# Patient Record
Sex: FEMALE | Race: WHITE | Employment: FULL TIME | URBAN - NONMETROPOLITAN AREA
[De-identification: names, ages, dates, MRNs, and addresses within clinical notes are randomized per-mention and may not be internally consistent; named-entity substitution may affect disease eponyms.]

---

## 2021-09-02 ENCOUNTER — HOSPITAL ENCOUNTER (EMERGENCY)
Age: 56
Discharge: HOME | End: 2021-09-02
Payer: COMMERCIAL

## 2021-09-02 ENCOUNTER — APPOINTMENT (OUTPATIENT)
Dept: GENERAL RADIOLOGY | Age: 56
End: 2021-09-02
Payer: COMMERCIAL

## 2021-09-02 VITALS
RESPIRATION RATE: 14 BRPM | DIASTOLIC BLOOD PRESSURE: 95 MMHG | SYSTOLIC BLOOD PRESSURE: 152 MMHG | OXYGEN SATURATION: 99 % | TEMPERATURE: 98.9 F | HEART RATE: 68 BPM

## 2021-09-02 DIAGNOSIS — S42.401A CLOSED FRACTURE OF RIGHT ELBOW, INITIAL ENCOUNTER: ICD-10-CM

## 2021-09-02 DIAGNOSIS — S00.83XA CONTUSION OF FACE, INITIAL ENCOUNTER: ICD-10-CM
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
## 2021-09-02 PROCEDURE — 90715 TDAP VACCINE 7 YRS/> IM: CPT | Performed by: PHYSICIAN ASSISTANT

## 2021-09-02 PROCEDURE — 6360000002 HC RX W HCPCS: Performed by: PHYSICIAN ASSISTANT

## 2021-09-02 PROCEDURE — 6370000000 HC RX 637 (ALT 250 FOR IP): Performed by: PHYSICIAN ASSISTANT

## 2021-09-02 PROCEDURE — 90471 IMMUNIZATION ADMIN: CPT | Performed by: PHYSICIAN ASSISTANT

## 2021-09-02 PROCEDURE — 73090 X-RAY EXAM OF FOREARM: CPT

## 2021-09-02 PROCEDURE — 29105 APPLICATION LONG ARM SPLINT: CPT

## 2021-09-02 PROCEDURE — 99284 EMERGENCY DEPT VISIT MOD MDM: CPT

## 2021-09-02 RX ORDER — IBUPROFEN 600 MG/1
600 TABLET ORAL EVERY 6 HOURS PRN
Qty: 120 TABLET | Refills: 0 | Status: SHIPPED | OUTPATIENT
Start: 2021-09-02

## 2021-09-02 RX ORDER — HYDROCODONE BITARTRATE AND ACETAMINOPHEN 5; 325 MG/1; MG/1
1 TABLET ORAL ONCE
Status: COMPLETED | OUTPATIENT
Start: 2021-09-02 | End: 2021-09-02

## 2021-09-02 RX ORDER — IBUPROFEN 400 MG/1
400 TABLET ORAL ONCE
Status: COMPLETED | OUTPATIENT
Start: 2021-09-02 | End: 2021-09-02

## 2021-09-02 RX ORDER — TRAMADOL HYDROCHLORIDE 50 MG/1
50 TABLET ORAL EVERY 6 HOURS PRN
Qty: 30 TABLET | Refills: 0 | Status: SHIPPED | OUTPATIENT
Start: 2021-09-02 | End: 2021-09-05

## 2021-09-02 RX ADMIN — IBUPROFEN 400 MG: 400 TABLET, FILM COATED ORAL at 17:52

## 2021-09-02 RX ADMIN — HYDROCODONE BITARTRATE AND ACETAMINOPHEN 1 TABLET: 5; 325 TABLET ORAL at 17:52

## 2021-09-02 RX ADMIN — TETANUS TOXOID, REDUCED DIPHTHERIA TOXOID AND ACELLULAR PERTUSSIS VACCINE, ADSORBED 0.5 ML: 5; 2.5; 8; 8; 2.5 SUSPENSION INTRAMUSCULAR at 17:53

## 2021-09-02 ASSESSMENT — PAIN DESCRIPTION - FREQUENCY: FREQUENCY: CONTINUOUS

## 2021-09-02 ASSESSMENT — PAIN DESCRIPTION - ORIENTATION: ORIENTATION: LEFT;RIGHT

## 2021-09-02 ASSESSMENT — PAIN DESCRIPTION - DESCRIPTORS: DESCRIPTORS: SORE

## 2021-09-02 ASSESSMENT — PAIN SCALES - GENERAL
PAINLEVEL_OUTOF10: 10
PAINLEVEL_OUTOF10: 10

## 2021-09-02 ASSESSMENT — PAIN DESCRIPTION - PAIN TYPE: TYPE: ACUTE PAIN

## 2021-09-02 ASSESSMENT — PAIN DESCRIPTION - LOCATION: LOCATION: ARM

## 2021-09-02 NOTE — ED PROVIDER NOTES
[facility] ED  eMERGENCY dEPARTMENT eNCOUnter      Pt Name: Elenita Crum  MRN: [id number]  [place] 1965  Date of evaluation: 9/2/21      CHIEF COMPLAINT       Chief Complaint   Patient presents with    Fall     pt states she trippe don her concrete steps at home just PTA. Pt c/o bilateral arm pain, facial pain         HISTORY OF PRESENT ILLNESS    Elenita Crum is a 48 y.o. female who presents complaining of tripped fell on outstretched arms  The history is provided by the patient. Arm Injury  Location:  Arm  Arm location:  R forearm and L forearm  Injury: yes    Time since incident:  1 hour  Mechanism of injury: fall    Fall:     Fall occurred: Tripped on the steps fell on outstretched hands causing pain to the right arm also suffered abrasions to the face and nose no loss of consciousness scraped her knees as well. She has having pain in the right forearm and as well as the left forearm. Worst.    Impact surface:  [place]    Point of impact:  Outstretched arms    Entrapped after fall: no    Pain details:     Quality:  Aching    Radiates to:  Does not radiate    Severity:  Moderate    Onset quality:  Sudden    Duration:  1 hour    Timing:  Intermittent    Progression:  Waxing and waning  Handedness:  Ambidextrous  Dislocation: no    Foreign body present:  No foreign bodies  Tetanus status:  Out of date  Prior injury to area:  Unable to specify  Relieved by:  Nothing  Worsened by: Movement  Ineffective treatments:  None tried  Associated symptoms: decreased range of motion and swelling    Associated symptoms: no tingling        REVIEW OF SYSTEMS       Review of Systems   Musculoskeletal: Positive for arthralgias. Bilateral forearm pain right worse than left. All other systems reviewed and are negative.       PAST MEDICAL HISTORY     Past Medical History:   Diagnosis Date    Cancer [facility])     rt breast lumpectomy    Lymph edema        SURGICAL HISTORY       Past Surgical History:   Procedure Laterality Date    BREAST SURGERY Right     CARPAL TUNNEL RELEASE Bilateral      SECTION      FOOT SURGERY Left     neuroma    FOOT SURGERY Right 2013    excision soft tissue right foot    COOPER AND BSO      13    TUBAL LIGATION      TUNNELED VENOUS PORT PLACEMENT      in and out       CURRENT MEDICATIONS       Previous Medications    ALPRAZOLAM (XANAX) 1 MG TABLET    Take 1 mg by mouth daily. ASPIRIN 81 MG TABLET    Take 81 mg by mouth daily. ORPHENADRINE (NORFLEX) 100 MG TABLET    Take 100 mg by mouth 2 times daily as needed. OXYCODONE-ACETAMINOPHEN (PERCOCET) 5-325 MG PER TABLET    Take 1 tablet by mouth every 6 hours as needed for Pain for 60 doses. TAMOXIFEN (NOLVADEX) 10 MG TABLET    Take 10 mg by mouth daily. ALLERGIES     is allergic to penicillins. FAMILY HISTORY     She indicated that her mother is alive. She indicated that her father is . She indicated that her sister is alive. SOCIAL HISTORY      reports that she has been smoking cigarettes. She has a 5.00 pack-year smoking history. She does not have any smokeless tobacco history on file. She reports current alcohol use. She reports that she does not use drugs. PHYSICAL EXAM     INITIAL VITALS: BP (!) 152/95   Pulse 68   Temp 98.9 °F (37.2 °C) (Tympanic)   Resp 14   SpO2 99%      Physical Exam  Vitals and nursing note reviewed. Constitutional:       Appearance: She is well-developed. HENT:      Head: Normocephalic. Abrasion present. No raccoon eyes, Mcgee's sign or laceration. Comments: Shows a few scattered superficial abrasions to the forehead nose and chin     Right Ear: Tympanic membrane normal.      Left Ear: Tympanic membrane normal.      Mouth/Throat:      Mouth: Mucous membranes are moist.   Eyes:      Extraocular Movements: Extraocular movements intact. Pupils: Pupils are equal, round, and reactive to light.    Cardiovascular:      Rate and Rhythm: Normal rate and regular rhythm. Heart sounds: Normal heart sounds. No murmur heard. Pulmonary:      Effort: Pulmonary effort is normal.      Breath sounds: Normal breath sounds. Abdominal:      General: Bowel sounds are normal.      Palpations: Abdomen is soft. Tenderness: There is no abdominal tenderness. Musculoskeletal:         General: Normal range of motion. Right forearm: Tenderness present. No edema or deformity. Left forearm: Tenderness present. No edema or deformity. Arms:       Cervical back: Normal range of motion. Legs:       Comments: Right forearm pain after fall, there is no obvious deformity but she does have pain with supination and pronation. Radial pulses palpable. She does have pain to the left forearm she is able to supinate and pronate and flex without difficulty. She does have some abrasions noted to bilateral knees no obvious deformity. Skin:     General: Skin is warm and dry. Capillary Refill: Capillary refill takes less than 2 seconds. Neurological:      Mental Status: She is alert and oriented to person, place, and time. Cranial Nerves: No cranial nerve deficit. Motor: No weakness. Deep Tendon Reflexes: Reflexes normal.         MEDICAL DECISION MAKING:     Right side radial head fracture noted on x-ray she got a moderate elbow effusion as well. She was placed in a long-arm splint and sling. She will be given analgesics anti-inflammatory and instruction to follow-up with orthopedics on-call who is Dr. Smita Duran or orthopedics of her choice. Recommend wash abraded areas with soap and water rinse pat dry apply light coating Neosporin. Follow-up with primary care for recheck as needed. Return if worse or any other concerns. No CT of the head or neck was done she has no neck pain CT she has no loss consciousness.     DIAGNOSTIC RESULTS     EKG: All EKG's are interpreted by the Emergency Department Physician who needed for Pain    TRAMADOL (ULTRAM) 50 MG TABLET    Take 1 tablet by mouth every 6 hours as needed for Pain for up to 3 days. Intended supply: 3 days.  Take lowest dose possible to manage pain       (Please note that portions of this note were completed with a voice recognition program.  Efforts were made to edit thedictations but occasionally words are mis-transcribed.)    TARI Shen PA-C  09/02/21 [id number]